# Patient Record
Sex: FEMALE | Race: WHITE
[De-identification: names, ages, dates, MRNs, and addresses within clinical notes are randomized per-mention and may not be internally consistent; named-entity substitution may affect disease eponyms.]

---

## 2020-01-03 NOTE — KCIC
Examination: KNEE BILAT 3V

 

History: Bilateral knee pain

 

Comparison/Correlation: None

 

Findings: Frontal and lateral views of the right knee and frontal and 

lateral views of the left knee were obtained.

 

Small right knee joint effusion is present. No acute fracture or bone 

destruction. Joint spaces are unremarkable.

 

Spurring about the left patella noted. Small left knee joint effusion is 

present. Severe left medial compartment degenerative narrowing is present.

Spurring about the medial and lateral compartments of the left knee noted.

No fracture or bone destruction.

 

 

Impression:

Severe left medial compartment  narrowing.

 

Bilateral knee joint effusions.

 

Electronically signed by: Jacobo Macias MD (1/3/2020 5:33 PM) [facility]

## 2020-01-03 NOTE — KCIC
Examination: ANKLE RIGHT 3V, FOOT RIGHT 3V

 

History: Ankle pain

 

Comparison/Correlation: None

 

Findings: 3 images right foot and 3 views of the right ankle were 

obtained.

 

Ankle joint mortise is unremarkable. Minimal spurring about the medial 

malleolus noted. No acute fracture or bony destruction. Small calcaneal 

spur identified.

 

 

Impression:

Mild spurring.

 

Electronically signed by: Jacobo Macias MD (1/3/2020 5:34 PM) [facility]

## 2020-01-03 NOTE — KCIC
Examination: ANKLE RIGHT 3V, FOOT RIGHT 3V

 

History: Ankle pain

 

Comparison/Correlation: None

 

Findings: 3 images right foot and 3 views of the right ankle were 

obtained.

 

Ankle joint mortise is unremarkable. Minimal spurring about the medial 

malleolus noted. No acute fracture or bony destruction. Small calcaneal 

spur identified.

 

 

Impression:

Mild spurring.

 

Electronically signed by: Jacobo Macias MD (1/3/2020 5:34 PM) [facility]

## 2020-01-29 VITALS
SYSTOLIC BLOOD PRESSURE: 158 MMHG | SYSTOLIC BLOOD PRESSURE: 158 MMHG | DIASTOLIC BLOOD PRESSURE: 67 MMHG | SYSTOLIC BLOOD PRESSURE: 158 MMHG | DIASTOLIC BLOOD PRESSURE: 67 MMHG | DIASTOLIC BLOOD PRESSURE: 67 MMHG

## 2020-01-29 NOTE — RAD
Examination: CT head and cervical spine without contrast

 

HISTORY: History of fall, pain

 

COMPARISON: None available

 

 

CT HEAD

 

 

Exposure: One or more of the following individualized dose reduction 

techniques were utilized for this examination:  1. Automated exposure 

control  2. Adjustment of the mA and/or kV according to patient size  3. 

Use of iterative reconstruction technique

 

TECHNIQUE: 5 mm contiguous axial images were obtained from the skull base 

to the vertex in both bone and soft tissue algorithm.  

 

FINDINGS: 

No abnormal attenuation within the brain parenchyma.  

 

No evidence of acute intracranial hemorrhage.   No extra-axial fluid 

collections.

 

No mass effect or midline shift. Ventricular size is appropriate.  Basal 

cisterns are patent.

 

No fractures identified.Gray-white differentiation is preserved.Globes and

orbits are within normal limits.   Paranasal sinuses and mastoid air cells

are clear.   

 

IMPRESSION:

 

No acute intracranial findings. 

 

 

CT CERVICAL SPINE

 

 

 

Technique: 2.5 mm contiguous axial images were obtained from the skull 

base through the cervicothoracic junction in both bone and soft tissue 

algorithm.  Additional sagittal and coronal reconstructions were also 

performed. 

 

FINDINGS: Vertebral body height and alignment are maintained.  Cervical 

lordosis is preserved.  The lateral masses of C1 are aligned upon C2.  

 

No fractures identified.  The bony canal is patent throughout.

 

Mild intervertebral disc height loss identified in the cervical spine 

likely degenerative changes.  

 

The paraspinous soft tissues are unremarkable.  Visualized intracranial 

contents are unremarkable.  Lung apices are clear. 

 

IMPRESSION:

 

No acute fracture of the cervical spine. Correlate clinically.   

 

Electronically signed by: Hamzah Carter MD (1/29/2020 5:26 PM) [facility]

## 2020-01-29 NOTE — RAD
Examination: CT lumbar spine without contrast

 

HISTORY: History of fall, back pain

 

COMPARISON: None available.

 

TECHNIQUE: Axial CT images of the lumbar spine were performed without 

contrast. Coronal and sagittal 

reformats are performed

 

Exposure: One or more of the following individualized dose reduction 

techniques were utilized for this examination:  1. Automated exposure 

control  2. Adjustment of the mA and/or kV according to patient size  3. 

Use of iterative reconstruction technique

 

 

 

FINDINGS:

 

The lumbar vertebral body heights are maintained. The bilateral facets are

well aligned. Moderate intervertebral disc height loss identified at L5-S1

vertebral level with diffuse disc bulge causing moderate to severe spinal 

canal stenosis. Moderate disc bulge identified at L3-L4, L4 L4 vertebral 

level causing moderate stenosis. Tiny punctate 1 mm intrarenal collecting 

system calculi identified in the bilateral kidneys. There is a 9 mm 

calculus identified in the proximal right ureter causing mild right-sided 

hydronephrosis.

 

 

 

IMPRESSION:

 

1. No acute osseous findings.

 

2. Multilevel degenerative changes as described above.

 

3. 9 mm calculus in the proximal right ureter causing mild right-sided 

hydronephrosis.

 

Electronically signed by: Hamzah Carter MD (1/29/2020 5:43 PM) [facility]

## 2020-01-29 NOTE — PHYS DOC
Past Medical History


Past Medical History:  Cancer (melonoma of skin), GERD, High Cholesterol, 

Migraines


 (HUGO FONSECA)


Past Surgical History:  No Surgical History


 (HUGO FONSECA)


Alcohol Use:  Occasionally


 (HUGO FONSECA)


Attending Signature


I have participated in the care of this patient and I have reviewed and agree 

with all pertinent clinical information above including history, exam, and 

recommendations.





 (JADON DIANE MD)





Adult General


Chief Complaint


Chief Complaint:  HEAD INJURY/TRAUMA





HPI


HPI





Patient is a 49  year old female with history of hyperlipidemia, acid reflex, 

insomnia, ADD, memory problems, migraine headaches, who presents to the ED today

from the doctor's office to be evaluated for concussion. PCP sent patient to get

a CAT scan of the head and cervical spine. Patient reports on Thursday last week

she slipped on ice and fell landing on her bottom. Denies hitting her head on 

the ground. Denies any loss of consciousness. Denies being on any blood 

thinners. She rates her headache and back pain is 10 out of 10. Denies pain 

radiating to bilateral lower extremities. Denies any loss of bowel bladder 

function. Patient is very shot during conversations.


 (HUGO FONSECA)





Review of Systems


Review of Systems





Constitutional: Denies fever or chills []


Eyes: Denies change in visual acuity, redness, or eye pain []


HENT: Denies nasal congestion or sore throat []


Respiratory: Denies cough or shortness of breath []


Cardiovascular: No additional information not addressed in HPI []


GI: Denies abdominal pain, nausea, vomiting, bloody stools or diarrhea []


: Denies dysuria or hematuria []


Musculoskeletal: Reports low back pain


Integument: Denies rash or skin lesions []


Neurologic: Reports posterior head pain, denies focal weakness or sensory 

changes []








All other systems were reviewed and found to be within normal limits, except as 

documented in this note.


 (HUGO FONSECA)





Current Medications


Current Medications





Current Medications








 Medications


  (Trade)  Dose


 Ordered  Sig/Selena  Start Time


 Stop Time Status Last Admin


Dose Admin


 


 Ketorolac


 Tromethamine


  (Toradol Im)  60 mg  1X  ONCE  1/29/20 19:00


 1/29/20 19:01 DC 1/29/20 19:05


60 MG


 


 Tamsulosin HCl


  (Flomax)  0.4 mg  1X  ONCE  1/29/20 19:00


 1/29/20 19:01 DC 1/29/20 19:04


0.4 MG





 (JADON DIANE MD)





Allergies


Allergies





Allergies








Coded Allergies Type Severity Reaction Last Updated Verified


 


  No Known Drug Allergies    1/29/20 No





 (JADON DIANE MD)





Physical Exam


Physical Exam





Constitutional: Well developed, well nourished, no acute distress, non-toxic 

appearance. []


HENT: Normocephalic, atraumatic, bilateral external ears normal, oropharynx 

moist, no oral exudates, nose normal. []


Eyes: PERRLA, EOMI, conjunctiva normal, no discharge. [] 


Neck: Normal range of motion, no tenderness, supple, no stridor. [] 


Cardiovascular:Heart rate regular rhythm, no murmur []


Lungs & Thorax:  Bilateral breath sounds clear to auscultation []


Abdomen: Bowel sounds normal, soft, no tenderness, no masses, no pulsatile 

masses. [] 


Skin: Warm, dry, no erythema, no rash. [] 


Back: Diffuse paraspinal muscle tenderness bilateral lumbar spine including 

slight midline lumbar spine tenderness, no CVA tenderness. [] 


Extremities: No tenderness, no cyanosis, no clubbing, ROM intact, no edema. [] 


Neurologic: Alert and oriented X 3, normal motor function, normal sensory 

function, no focal deficits noted. Cranial nerves II through XII intact.


Psychologic: Flat affect


 (MUTUNGA,HUGO APRN)





Current Patient Data


Vital Signs





                                   Vital Signs








  Date Time  Temp Pulse Resp B/P (MAP) Pulse Ox O2 Delivery O2 Flow Rate FiO2


 


1/29/20 19:43  91 17 158/67 (97) 98 Room Air  


 


1/29/20 16:06 98.4       





 98.4       





 (JADON DIANE MD)


Lab Values





                                Laboratory Tests








Test


 1/29/20


16:05


 


Urine Collection Type Unknown  


 


Urine Color Yellow  


 


Urine Clarity Clear  


 


Urine pH 6.0  


 


Urine Specific Gravity 1.020  


 


Urine Protein


 100 mg/dL


(NEG-TRACE)


 


Urine Glucose (UA)


 Negative mg/dL


(NEG)


 


Urine Ketones (Stick)


 Negative mg/dL


(NEG)


 


Urine Blood Small (NEG)  


 


Urine Nitrite


 Negative (NEG)





 


Urine Bilirubin Small (NEG)  


 


Urine Urobilinogen Dipstick


 0.2 mg/dL (0.2


mg/dL)


 


Urine Leukocyte Esterase Trace (NEG)  


 


Urine RBC


 Occ /HPF (0-2)





 


Urine WBC


 11-20 /HPF


(0-4)


 


Urine Squamous Epithelial


Cells Mod /LPF  





 


Urine Bacteria


 Few /HPF


(0-FEW)


 


Urine Hyaline Casts Moderate /HPF  


 


Urine Mucus Mod /LPF  


 


Urine Opiates Screen Neg (NEG)  


 


Urine Methadone Screen Neg (NEG)  


 


Urine Barbiturates Neg (NEG)  


 


Urine Phencyclidine Screen Neg (NEG)  


 


Urine


Amphetamine/Methamphetamine Pos (NEG)  





 


Urine Benzodiazepines Screen Neg (NEG)  


 


Urine Cocaine Screen Neg (NEG)  


 


Urine Cannabinoids Screen Neg (NEG)  


 


Urine Ethyl Alcohol Neg (NEG)  





 (JADON DIANE MD)


Lab Values





                                Laboratory Tests








Test


 1/29/20


16:05


 


Urine Collection Type Unknown  


 


Urine Color Yellow  


 


Urine Clarity Clear  


 


Urine pH 6.0  


 


Urine Specific Gravity 1.020  


 


Urine Protein


 100 mg/dL


(NEG-TRACE)


 


Urine Glucose (UA)


 Negative mg/dL


(NEG)


 


Urine Ketones (Stick)


 Negative mg/dL


(NEG)


 


Urine Blood Small (NEG)  


 


Urine Nitrite


 Negative (NEG)





 


Urine Bilirubin Small (NEG)  


 


Urine Urobilinogen Dipstick


 0.2 mg/dL (0.2


mg/dL)


 


Urine Leukocyte Esterase Trace (NEG)  


 


Urine RBC


 Occ /HPF (0-2)





 


Urine WBC


 11-20 /HPF


(0-4)


 


Urine Squamous Epithelial


Cells Mod /LPF  





 


Urine Bacteria


 Few /HPF


(0-FEW)


 


Urine Hyaline Casts Moderate /HPF  


 


Urine Mucus Mod /LPF  


 


Urine Opiates Screen Neg (NEG)  


 


Urine Methadone Screen Neg (NEG)  


 


Urine Barbiturates Neg (NEG)  


 


Urine Phencyclidine Screen Neg (NEG)  


 


Urine


Amphetamine/Methamphetamine Pos (NEG)  





 


Urine Benzodiazepines Screen Neg (NEG)  


 


Urine Cocaine Screen Neg (NEG)  


 


Urine Cannabinoids Screen Neg (NEG)  


 


Urine Ethyl Alcohol Neg (NEG)  








 (MUTUNGAHUGO APRN)





EKG


EKG


[]


 (VIKKIUNGAHUGO APRN)





Radiology/Procedures


Radiology/Procedures


[]PROCEDURE: CT HEAD AND CERVICAL SPINE WO





Examination: CT head and cervical spine without contrast


 


HISTORY: History of fall, pain


 


COMPARISON: None available


 


 


CT HEAD


 


 


Exposure: One or more of the following individualized dose reduction 


techniques were utilized for this examination:  1. Automated exposure 


control  2. Adjustment of the mA and/or kV according to patient size  3. 


Use of iterative reconstruction technique


 


TECHNIQUE: 5 mm contiguous axial images were obtained from the skull base 


to the vertex in both bone and soft tissue algorithm.  


 


FINDINGS: 


No abnormal attenuation within the brain parenchyma.  


 


No evidence of acute intracranial hemorrhage.   No extra-axial fluid 


collections.


 


No mass effect or midline shift. Ventricular size is appropriate.  Basal 


cisterns are patent.


 


No fractures identified.Gray-white differentiation is preserved.Globes and


orbits are within normal limits.   Paranasal sinuses and mastoid air cells


are clear.   


 


IMPRESSION:


 


No acute intracranial findings. 


 


 


CT CERVICAL SPINE


 


 


 


Technique: 2.5 mm contiguous axial images were obtained from the skull 


base through the cervicothoracic junction in both bone and soft tissue 


algorithm.  Additional sagittal and coronal reconstructions were also 


performed. 


 


FINDINGS: Vertebral body height and alignment are maintained.  Cervical 


lordosis is preserved.  The lateral masses of C1 are aligned upon C2.  


 


No fractures identified.  The bony canal is patent throughout.


 


Mild intervertebral disc height loss identified in the cervical spine 


likely degenerative changes.  


 


The paraspinous soft tissues are unremarkable.  Visualized intracranial 


contents are unremarkable.  Lung apices are clear. 


 


IMPRESSION:


 


No acute fracture of the cervical spine. Correlate clinically.   


 


Electronically signed by: Hamzah Carter MD (1/29/2020 5:26 PM) [facility]














DICTATED and SIGNED BY:     HAMZAH CARTER MD


DATE:     01/29/20 1726


PROCEDURE: CT LUMBAR SPINE WO CONTRAST





Examination: CT lumbar spine without contrast


 


HISTORY: History of fall, back pain


 


COMPARISON: None available.


 


TECHNIQUE: Axial CT images of the lumbar spine were performed without 


contrast. Coronal and sagittal 


reformats are performed


 


Exposure: One or more of the following individualized dose reduction 


techniques were utilized for this examination:  1. Automated exposure 


control  2. Adjustment of the mA and/or kV according to patient size  3. 


Use of iterative reconstruction technique


 


 


 


FINDINGS:


 


The lumbar vertebral body heights are maintained. The bilateral facets are


well aligned. Moderate intervertebral disc height loss identified at L5-S1


vertebral level with diffuse disc bulge causing moderate to severe spinal 


canal stenosis. Moderate disc bulge identified at L3-L4, L4 L4 vertebral 


level causing moderate stenosis. Tiny punctate 1 mm intrarenal collecting 


system calculi identified in the bilateral kidneys. There is a 9 mm 


calculus identified in the proximal right ureter causing mild right-sided 


hydronephrosis.


 


 


 


IMPRESSION:


 


1. No acute osseous findings.


 


2. Multilevel degenerative changes as described above.


 


3. 9 mm calculus in the proximal right ureter causing mild right-sided 


hydronephrosis.


 


Electronically signed by: Hamzah Carter MD (1/29/2020 5:43 PM) [facility]














DICTATED and SIGNED BY:     HAMZAH CARTER MD


DATE:     01/29/20 1743


 (HUGO FONSECA)





Course & Med Decision Making


Course & Med Decision Making


Pertinent Labs and Imaging studies reviewed. (See chart for details)





This is a 49-year-old female patient presenting to the ED today complaining of 

head pain, low back pain. Patient fell down on Thursday last week, was seen by 

the PCP today and was sent to the ED for CAT scan of the head, cervical spine. 

Patient denies any loss of consciousness when she fell, she never hit her head 

on the ground. She has a flat affect and barely giving us any information





CT of the head and cervical spine is negative for any acute findings.  Ct of the

 lumbar spine is negative for acute process notef for incidental findings of  9 

mm calculus in the proximal right ureter causing mild right-sided hydronephr

osis. urine with trace leukocytes. 





Spoke with Dr. Degroot at [facility], she requested 

patient be discharged to home and contact Dr. Gongora a urologist tomorrow 

morning and set up a follow-up appointment. Patient was given Toradol and Flomax

 in the ED. Discharged with flomax, and cipro. 














 (HUGO FONSECA)





Dragon Disclaimer


Dragon Disclaimer


This electronic medical record was generated, in whole or in part, using a voice

 recognition dictation system.


 (HUGO FONSECA)





Departure


Departure


Impression:  


   Primary Impression:  


   Fall from standing


   Additional Impressions:  


   Concussion


   Kidney stone


   Acute cervical sprain


   Hydronephrosis, right


Disposition:  01 HOME, SELF-CARE


Condition:  STABLE


Referrals:  


GALINDO LEON MD (PCP)


Patient Instructions:  Concussion-SportsMed, Fall Prevention and Home Safety, 

Kidney Stones, Easy-to-Read





Additional Instructions:  


You were evaluated in the emergency room after falling, your CAT scan of the 

head, cervical spine and lumbar spine were negative for any acute findings, You 

were  noted to have an incidental finding  9 mm calculus in the proximal right 

ureter causing mild right-sided 


hydronephrosis. 





Please contact Dr. Bhaskar Gongora at [facility] tomorrow 

morning and set up a follow-up appointment. His phone number is [phone]


Scripts


Ciprofloxacin (CIPRO) 500 Mg/5 Ml [email or web address]


1 ML PO BID for 10 Days, #100 ML 0 Refills


   Prov: HUGO FONSECA APRN         1/29/20 


Tamsulosin Hcl (FLOMAX) 0.4 Mg Cap.er.24h


1 CAP PO DAILY, #6 CAP


   Prov: HUGO FONSECA APRN         1/29/20





Problem Qualifiers








   Primary Impression:  


   Fall from standing


   Encounter type:  initial encounter  Qualified Codes:  W19.XXXA - Unspecified 

   fall, initial encounter


   Additional Impressions:  


   Concussion


   Encounter type:  initial encounter  Loss of consciousness presence/duration: 

    with LOC of unspecified duration  Qualified Codes:  S06.0X9A - Concussion 

   with loss of consciousness of unspecified duration, initial encounter


   Acute cervical sprain


   Encounter type:  initial encounter  Qualified Codes:  S13.9XXA - Sprain of 

   joints and ligaments of unspecified parts of neck, initial encounter








HUGO FONSECA KAROLINA              Jan 29, 2020 16:46


JADON DIANE MD              Jan 30, 2020 04:10

## 2020-06-04 ENCOUNTER — HOSPITAL ENCOUNTER (OUTPATIENT)
Dept: HOSPITAL 61 - MAMMO | Age: 50
Discharge: HOME | End: 2020-06-04
Attending: FAMILY MEDICINE
Payer: SELF-PAY

## 2020-06-04 DIAGNOSIS — N64.89: ICD-10-CM

## 2020-06-04 DIAGNOSIS — Z12.31: Primary | ICD-10-CM

## 2020-06-04 PROCEDURE — 77067 SCR MAMMO BI INCL CAD: CPT

## 2020-06-08 NOTE — RAD
DATE: 6/4/2020 12:23 PM



EXAM: DIGITAL SCREEN BILAT W/CAD



HISTORY:  Screening



COMPARISON: 5/31/2019



Bilateral full field craniocaudal and mediolateral oblique images were

obtained using digital technique.



This study was interpreted with the benefit of Computerized Aided Detection

(CAD).





FINDINGS:



Breast Density: SCATTERED  The breast parenchyma shows scattered

fibroglandular densities. Breast parenchyma level B





No suspicious masses, microcalcifications or architectural distortion is

present to suggest malignancy in either breast.





The visualized axillae are unremarkable. 



IMPRESSION: No mammographic evidence of malignancy. 



BI-RADS CATEGORY: 1 NEGATIVE



RECOMMENDED FOLLOW-UP: 12M 12 MONTH FOLLOW-UP Annual screening mammography is

recommended, unless clinically indicated sooner based on symptoms or change in

physical exam.



PQRS compliance statement: Patient information was entered into a reminder

system with a target due date 6/5/2021 for the next mammogram.



Mammography is a sensitive method for finding small breast cancers, but it

does not detect them all and is not a substitute for careful clinical

examination.  A negative mammogram does not negate a clinically suspicious

finding and should not result in delay in biopsying a clinically suspicious

abnormality.



"Our facility is accredited by the American College of [employer] Mammography

Program."

## 2021-02-03 ENCOUNTER — HOSPITAL ENCOUNTER (OUTPATIENT)
Dept: HOSPITAL 61 - KCIC | Age: 51
End: 2021-02-03
Attending: FAMILY MEDICINE
Payer: COMMERCIAL

## 2021-02-03 DIAGNOSIS — M79.671: ICD-10-CM

## 2021-02-03 DIAGNOSIS — M17.0: Primary | ICD-10-CM

## 2021-02-03 PROCEDURE — 73630 X-RAY EXAM OF FOOT: CPT

## 2021-02-03 NOTE — KCIC
XR FOOT_RIGHT 3 VIEWS 2/3/2021 3:01 PM



INDICATION: Right anterior foot pain



COMPARISON: None available.



TECHNIQUE:  2 views the right foot are provided.



FINDINGS/

IMPRESSION:

There is no acute fracture or dislocation. Joint spaces are maintained. Bone mineralization is within
 normal limits. Regional soft tissues are within normal limits. There is no soft tissue gas or osseou
s erosion. No radiopaque foreign body.



Electronically signed by: Janine Cooper MD (2/3/2021 8:47 PM) [facility]

## 2021-02-03 NOTE — KCIC
XR KNEE 3 VIEWS 



DATE:  2/3/2021 3:01 PM



INDICATION:  Chronic bilateral knee pain, Lt>Rt, previous surgery Lt knee. General knee pain   



COMPARISON:  None.



FINDINGS:



Right: There is no evidence of acute fracture or dislocation. Mild degenerative changes of the medial
 compartment. There is no joint effusion. 



Left: There is no evidence of acute fracture or dislocation. Severe degenerative changes of the media
l compartment, mild lateral and patellofemoral compartment degenerative changes. There is no joint ef
fusion. 



IMPRESSION:  



Bilateral degenerative changes, worst on the left with severe medial compartment joint space narrowin
g.



Electronically signed by: Marcos Lane MD (2/3/2021 6:30 PM) [facility]

## 2021-06-25 ENCOUNTER — HOSPITAL ENCOUNTER (OUTPATIENT)
Dept: HOSPITAL 61 - KCIC DEXA | Age: 51
End: 2021-06-25
Attending: FAMILY MEDICINE
Payer: COMMERCIAL

## 2021-06-25 DIAGNOSIS — E55.9: Primary | ICD-10-CM

## 2021-06-25 PROCEDURE — 77080 DXA BONE DENSITY AXIAL: CPT

## 2021-06-25 NOTE — KCIC
Bone Densitometry 



History: Reason: VITAMIN D DEFICIENCY / Spl. Instructions:  / History: 



Findings:



Bone Densitometry was performed with dual photon absorption of the lumbar spine and proximal femurs. 




Lumbar Spine L1-L4:



Bone density is 1.071 g/cm2 for L1-L4. T-score is 0.2. Z-score is 1.1. 



Total left proximal femur: 



Bone density is 0.910 g/cm2.  T-score is -0.3. Z-score is 0.3. 



IMPRESSION:

Normal bone mineral density..  



World Health Organization definition of osteoporosis and osteopenia for  women: normal equal
s T score at or above -1.0 standard deviations; osteopenia equals T score between -1.0 and -2.5 stand
nichole deviations; osteoporosis equals T score at or below -2.5 standard deviations.  



Electronically signed by: Mar Toure MD (6/25/2021 2:19 PM) [id number]

## 2021-08-09 ENCOUNTER — HOSPITAL ENCOUNTER (OUTPATIENT)
Dept: HOSPITAL 35 - PAC | Age: 51
End: 2021-08-09
Attending: ORTHOPAEDIC SURGERY
Payer: COMMERCIAL

## 2021-08-09 DIAGNOSIS — Z01.810: ICD-10-CM

## 2021-08-09 DIAGNOSIS — R00.1: ICD-10-CM

## 2021-08-09 DIAGNOSIS — M17.12: ICD-10-CM

## 2021-08-09 DIAGNOSIS — Z01.812: Primary | ICD-10-CM

## 2021-08-09 LAB
ALBUMIN SERPL-MCNC: 4 G/DL (ref 3.4–5)
ANION GAP SERPL CALC-SCNC: 10 MMOL/L (ref 7–16)
BILIRUB UR-MCNC: NEGATIVE MG/DL
BUN SERPL-MCNC: 18 MG/DL (ref 7–18)
CALCIUM SERPL-MCNC: 8.9 MG/DL (ref 8.5–10.1)
CHLORIDE SERPL-SCNC: 109 MMOL/L (ref 98–107)
CO2 SERPL-SCNC: 26 MMOL/L (ref 21–32)
COLOR UR: YELLOW
CREAT SERPL-MCNC: 1.1 MG/DL (ref 0.6–1)
ERYTHROCYTE [DISTWIDTH] IN BLOOD BY AUTOMATED COUNT: 13.1 % (ref 10.5–14.5)
GLUCOSE SERPL-MCNC: 82 MG/DL (ref 74–106)
HCT VFR BLD CALC: 42 % (ref 37–47)
HGB BLD-MCNC: 14 GM/DL (ref 12–15)
INR PPP: 1.05
KETONES UR STRIP-MCNC: (no result) MG/DL
MCH RBC QN AUTO: 30 PG (ref 26–34)
MCHC RBC AUTO-ENTMCNC: 33.4 G/DL (ref 28–37)
MCV RBC: 89.7 FL (ref 80–100)
PLATELET # BLD: 202 THOU/UL (ref 150–400)
POTASSIUM SERPL-SCNC: 4.1 MMOL/L (ref 3.5–5.1)
PROTHROMBIN TIME: 11.4 SECONDS (ref 10.5–12.1)
RBC # BLD AUTO: 4.68 MIL/UL (ref 4.2–5)
RBC # UR STRIP: NEGATIVE /UL
SODIUM SERPL-SCNC: 145 MMOL/L (ref 136–145)
SP GR UR STRIP: 1.02 (ref 1–1.03)
URINE CLARITY: CLEAR
URINE GLUCOSE-RANDOM*: NEGATIVE
URINE LEUKOCYTES-REFLEX: (no result)
URINE NITRITE-REFLEX: NEGATIVE
URINE PROTEIN (DIPSTICK): (no result)
UROBILINOGEN UR STRIP-ACNC: 0.2 E.U./DL (ref 0.2–1)
WBC # BLD AUTO: 6.6 THOU/UL (ref 4–11)

## 2021-08-09 NOTE — EKG
[address] [place]  [address]
Phone:  [phone]                    ELECTROCARDIOGRAM REPORT      
_______________________________________________________________________________
 
Name:       JOSE ALEJANDRO PAGAN              Room #:                     [facility]#:      [id number]     Account #:      [id number]  
Admission:  21    Attend Phys:    Deandre Bridges MD  
Discharge:              Date of Birth:  [birth year]  
                                                          Report #: [id number]
   [id number]
_______________________________________________________________________________
                          [facility]
                                       
Test Date:    2021               Test Time:    13:20:15
Pat Name:     JOSE ALEJANDRO PAGAN           Department:   
Patient ID:   [id number]            Room:          
Gender:       F                        Technician:   CIPRIANO
:          [id number]               Requested By: Deandre Bridges
Order Number: [id number] MD:   Gustabo Bland
                                 Measurements
Intervals                              Axis          
Rate:         57                       P:            27
OH:           147                      QRS:          19
QRSD:         96                       T:            24
QT:           417                                    
QTc:          406                                    
                           Interpretive Statements
Sinus bradycardia
Otherwise normal tracing
No previous ECG available for comparison
Electronically Signed On 2021 15:54:31 CDT by Gustabo Bland
[email or web address]
 
 
 
 
 
 
 
 
 
 
 
 
 
 
 
 
 
 
 
 
 
 
  <ELECTRONICALLY SIGNED>
   By: uGstabo Bland MD, [facility]   
  21     1554
D: 21 1320                           _____________________________________
T: 21 1320                           Gustabo Bland MD, FACC     /EPI

## 2021-08-16 ENCOUNTER — HOSPITAL ENCOUNTER (OUTPATIENT)
Dept: HOSPITAL 35 - OR | Age: 51
Setting detail: OBSERVATION
LOS: 1 days | Discharge: HOME | End: 2021-08-17
Attending: ORTHOPAEDIC SURGERY | Admitting: ORTHOPAEDIC SURGERY
Payer: COMMERCIAL

## 2021-08-16 VITALS — DIASTOLIC BLOOD PRESSURE: 74 MMHG | SYSTOLIC BLOOD PRESSURE: 144 MMHG

## 2021-08-16 VITALS — DIASTOLIC BLOOD PRESSURE: 69 MMHG | SYSTOLIC BLOOD PRESSURE: 127 MMHG

## 2021-08-16 VITALS — WEIGHT: 221.9 LBS | BODY MASS INDEX: 43.56 KG/M2 | HEIGHT: 60 IN

## 2021-08-16 DIAGNOSIS — Z79.82: ICD-10-CM

## 2021-08-16 DIAGNOSIS — Z79.899: ICD-10-CM

## 2021-08-16 DIAGNOSIS — Z20.822: ICD-10-CM

## 2021-08-16 DIAGNOSIS — M17.12: Primary | ICD-10-CM

## 2021-08-16 LAB
BILIRUB UR-MCNC: NEGATIVE MG/DL
COLOR UR: YELLOW
KETONES UR STRIP-MCNC: NEGATIVE MG/DL
NITRITE UR QL STRIP: NEGATIVE
RBC # UR STRIP: (no result) /UL
SP GR UR STRIP: 1.02 (ref 1–1.03)
URINE CLARITY: CLEAR
URINE GLUCOSE-RANDOM*: NEGATIVE
URINE LEUKOCYTES: NEGATIVE
URINE PROTEIN (DIPSTICK): NEGATIVE
UROBILINOGEN UR STRIP-ACNC: 0.2 E.U./DL (ref 0.2–1)

## 2021-08-16 PROCEDURE — 62900: CPT

## 2021-08-16 PROCEDURE — 53000 INCISION OF URETHRA: CPT

## 2021-08-16 PROCEDURE — 51130: CPT

## 2021-08-16 PROCEDURE — 56527: CPT

## 2021-08-16 PROCEDURE — 57095: CPT

## 2021-08-16 PROCEDURE — 54118: CPT

## 2021-08-16 PROCEDURE — 50415: CPT

## 2021-08-16 PROCEDURE — 57103: CPT

## 2021-08-16 PROCEDURE — 57110 VAGNC COMPL RMVL VAG WALL: CPT

## 2021-08-16 PROCEDURE — 52001 CYSTO W/IRRG&EVAC MLT CLOTS: CPT

## 2021-08-16 PROCEDURE — 50954: CPT

## 2021-08-16 PROCEDURE — 56528: CPT

## 2021-08-16 PROCEDURE — 64043: CPT

## 2021-08-16 PROCEDURE — 50010 RENAL EXPLORATION: CPT

## 2021-08-16 PROCEDURE — 65060: CPT

## 2021-08-16 PROCEDURE — 62110: CPT

## 2021-08-16 PROCEDURE — 52282 CYSTOSCOPY IMPLANT STENT: CPT

## 2021-08-16 PROCEDURE — 57127: CPT

## 2021-08-16 PROCEDURE — 51320: CPT

## 2021-08-16 PROCEDURE — 51225: CPT

## 2021-08-16 PROCEDURE — 53078: CPT

## 2021-08-16 PROCEDURE — 70005: CPT

## 2021-08-16 PROCEDURE — 53365: CPT

## 2021-08-16 PROCEDURE — 50101: CPT

## 2021-08-16 NOTE — O
[facility]
[place]   [address]                     OPERATIVE REPORT              
_______________________________________________________________________________
 
Name:       JOSE ALEJANDRO PAGAN              Room #:         443-P       [facility] Soraida DIALLO#:      [id number]                       Account #:      [id number]  
Admission:  08/16/21    Attend Phys:    Deandre Bridges MD  
Discharge:              Date of Birth:  [birth year]  
                                                          Report #: [id number]
                                                                    [id number] 
_______________________________________________________________________________
THIS REPORT FOR:  
 
cc:  Physician not on staff        
     Physician not on staff                                               
     Deandre Bridges MD                                          ~
 
DATE OF SERVICE: 08/16/2021
 
PREOPERATIVE DIAGNOSIS:  Left knee osteoarthritis.
 
POSTOPERATIVE DIAGNOSIS:  Left knee osteoarthritis.
 
PROCEDURE:  Left total knee arthroplasty using Navio robotic assistance.
 
SURGEON:  Deandre Bridges MD
 
ASSISTANT:  Marie Garg PA-C.
 
INDICATION FOR ASSISTANT:  Throughout the case, extensive retraction, 
manipulation of the knee was required.  This was afforded to me by my assistant.
 
ANESTHESIA:  LMA with adductor canal block.
 
IMPLANTS:  A [employer] size 3 Journey II BCS Oxinium femur, size 2 tibia,
size 12 constrained polyethylene and size 29 patella.
 
TOURNIQUET TIME:  51 minutes.
 
ESTIMATED BLOOD LOSS:  25 mL.
 
COMPLICATIONS:  None.
 
SPECIMENS:  None.
 
CONDITION UPON LEAVING THE OR:  Stable.
 
INDICATIONS FOR PROCEDURE:  The patient is a 51-year-old female with severe left
knee osteoarthritis.  She had failed conservative measures for this and after 
discussion with her, she elected for left total knee arthroplasty.
 
DESCRIPTION OF PROCEDURE:  Risks, benefits, alternatives, complications were 
discussed in detail with the patient including but not limited to risk of 
anesthesia, risk of damage to nerves, arteries, blood vessels, risk for 
infection, bleeding, risk for continued knee pain, need for reoperation.  
Informed consent was obtained from the patient's left knee was appropriately 
marked in the preoperative holding area.  IV Ancef was given for preoperative 
 
 
 
[address]   [address]                     OPERATIVE REPORT              
_______________________________________________________________________________
 
Name:       JOSE ALEJANDRO PAGAN CYDNEY              Room #:         443-P       [facility] Soraida DIALLO#:      [id number]                       Account #:      [id number]  
Admission:  08/16/21    Attend Phys:    Deandre Bridges MD  
Discharge:              Date of Birth:  [birth year]  
                                                          Report #: [id number]
                                                                    [id number] 
_______________________________________________________________________________
 
antibiotics.  Adductor canal block was placed by anesthesia.  She was brought to
the operating room and placed in supine position on the operating table.  LMA 
anesthesia was induced without complication.  Tourniquet was placed on the left 
side.  Left lower extremity was prepped and draped in normal sterile fashion.  
Timeout was performed properly identifying the patient and procedure as well as 
instrumentation and implants.  All in the operating room in agreement.  Left 
lower extremity was exsanguinated, tourniquet was inflated.  Tourniquet time was
51 minutes.  Standard midline approach to the knee was made with 10 blade 
through the skin.  Dissection was taken down sharply to the fascia and deep 
flaps were developed medially and laterally.  Fresh 10 blade was used to make a 
medial parapatellar arthrotomy and the knee was inspected.  There was severe 
medial compartment osteoarthritis with moderate lateral and patellofemoral 
compartment osteoarthritis.  ACL and PCL were removed sharply.  Reference pins 
were placed in the femur and the tibia.  The knee was then digitally mapped 
using the [employer] robotic system.  Intraoperative plan was made.  We sized the 
size 3 femur, the size 2 tibia with a 10 spacer.  After acceptance of the 
intraoperative plan, the distal femoral cut was made with Navio bur.  Distal 
femoral cutting block was pinned in place and chamfer cuts were made.  Attention
was turned to the tibia.  Remainder of the menisci removed with Bovie cautery.  
Tibial resection guide was pinned in place and tibial resection was made.  
Flexion and extension gaps were then checked and found to have good balance 
medially, laterally in flexion and extension.  Tibia sized, found to be a size 
2.  Size 2 tibial trial was placed, pinned and punched.  Size 3 femoral trial 
was placed, box cut was made.  This was then trialed with a size 10 up to a size
12 polyethylene, size 12 polyethylene demonstrated 1-2 mm of laxity laterally 
throughout range of motion of the knee.  There were up to 3 mm medially.  It was
felt we can make up for this with a constrained implant, 9 mm of bone was 
resected from the posterior surface of the patella and a size 29 patellar trial 
button was placed, knee was taken through range of motion, found to be stable, 
found to have good patellar tracking.  Trial components were removed.  Bone ends
were thoroughly irrigated with normal saline.  Final size 2 tibia, size 3 
Journey II BCS Oxinium femur and a size 29 patella were cemented in place using 
standard cementation techniques.  While the cement cured, a periarticular 
injection consisting of morphine, ropivacaine, epinephrine, Toradol was placed 
around the knee joint capsule.  After the cement cured, tourniquet was deflated.
 Hemostasis was obtained with Bovie cautery.  A final size 12 constrained 
polyethylene was placed.  A gram of vancomycin was placed deep in the joint.  
Fascia was closed with 0 Vicryl.  Skin was closed with 2-0 Vicryl, 3-0 Monocryl.
 Dermabond and a BRIAN dressing was applied.  The patient tolerated this 
procedure well and went to recovery room under care of anesthesia 
postoperatively.
 
 
                         
   By:                               
                   
D: 08/16/21 1549                           _____________________________________
T: 08/16/21 1905                           Deandre Bridges MD            /nt

## 2021-08-17 VITALS — DIASTOLIC BLOOD PRESSURE: 60 MMHG | SYSTOLIC BLOOD PRESSURE: 102 MMHG

## 2021-08-17 VITALS — SYSTOLIC BLOOD PRESSURE: 113 MMHG | DIASTOLIC BLOOD PRESSURE: 59 MMHG

## 2021-08-17 VITALS — DIASTOLIC BLOOD PRESSURE: 59 MMHG | SYSTOLIC BLOOD PRESSURE: 113 MMHG

## 2021-08-17 NOTE — NUR
Assumed care of pt at 0700. Pt a&ox4. Pain controlled with prn pain medicine.
Dressing c/d/i. DARSHAN hose and SCDs in place. Pt worked with physical therapy
this am. Likely going home this afternoon. Family at bedside. Call light
within reach. Will continue to monitor.

## 2021-08-17 NOTE — NUR
ADMISSION HX,EDUCATION AND ASSESSMENT COMPLETED.PT'S VSS.PT UP WITH ASSIST/GB
AND WALKER TO THE B[facility],GOOD ENDURANCE NOTED.PAIN MANAGED WITH MED.PER REPORT,PT
HAD APNEA EPISODE IN PACU,PT ON 4L/NC.PT ALERT,IRRITABLE AT START OF SHIFT.PT
BRENDA CLEAR LIQUID DIET WELL.PT WAS A LITTLE NAUSEOUS WHEN SHE GOT UP TO THE
BSC,REF MED.PT ON CONT PULSE OX,SAT IN UPPER 90'S.SCD/POLAR PACK AND DARSHAN IN
PLACE.PT ABLE TO MAKE HER NEEDS KNOWN.CALL LIGHT WITHIN REACH.

## 2021-08-17 NOTE — NUR
ASSESSMENT: CM REVIEWED CHART AND SPOKE WITH PATIENT AT THE BEDSIDE. PT IS
ALERT AND ORIENTED X4. PT IS S/P L TKA. PT REPORTS THAT SHE LIVES IN AN APT
BUT WILL BE STAYING IN A
HOUSE
WITH HER MOTHER AT DISCHARGE.  PT REPORTS HAVING ABOUT 5 STEPS WITH A HANDRAIL
TO ENTER AND
REPORTS ALL HER NEEDS ARE ON THE MAIN LEVEL. PT REPORTS BEING FULLY
INDEPENDENT WITH ADLS AND AMBULATION AND HAS NO DME. CM NOTIFIED PT SHE WILL
LIKELY NEED A WALKER AT THE TIME OF DISCHARGE AND PT REPORTS SHE HAS NO
PRFERENCE OF AGENCY BUT DOES NOT FEEL HER INSURANCE COVERES IT. CM NOTIFIED
LIASON AT [facility] TO PLEASE VERIFY. PT REPORTS THAT SHE HAS OUTPATIENT
THERAPY ARRANGED AT [facility]. CM DISCUSSED ROLE. PT IS TO CONTINUE
THERAPY AND WORK ON STAIRS THIS PM AND POSSIBLE DISCHARGE AFTER. AWAITING
FURTHER INPUT FROM [facility] REGARDING WALKER. CM WILL CONTINUE TO FOLLOW.

## 2021-08-20 ENCOUNTER — HOSPITAL ENCOUNTER (INPATIENT)
Dept: HOSPITAL 35 - ER | Age: 51
LOS: 6 days | Discharge: HOME | DRG: 552 | End: 2021-08-26
Attending: HOSPITALIST | Admitting: HOSPITALIST
Payer: COMMERCIAL

## 2021-08-20 VITALS — DIASTOLIC BLOOD PRESSURE: 85 MMHG | SYSTOLIC BLOOD PRESSURE: 162 MMHG

## 2021-08-20 VITALS — SYSTOLIC BLOOD PRESSURE: 146 MMHG | DIASTOLIC BLOOD PRESSURE: 51 MMHG

## 2021-08-20 VITALS — BODY MASS INDEX: 35.08 KG/M2 | HEIGHT: 62.99 IN | WEIGHT: 198 LBS

## 2021-08-20 VITALS — DIASTOLIC BLOOD PRESSURE: 38 MMHG | SYSTOLIC BLOOD PRESSURE: 106 MMHG

## 2021-08-20 DIAGNOSIS — F41.9: ICD-10-CM

## 2021-08-20 DIAGNOSIS — Z60.2: ICD-10-CM

## 2021-08-20 DIAGNOSIS — E66.01: ICD-10-CM

## 2021-08-20 DIAGNOSIS — K21.9: ICD-10-CM

## 2021-08-20 DIAGNOSIS — Z90.49: ICD-10-CM

## 2021-08-20 DIAGNOSIS — Z79.899: ICD-10-CM

## 2021-08-20 DIAGNOSIS — G47.33: ICD-10-CM

## 2021-08-20 DIAGNOSIS — D64.9: ICD-10-CM

## 2021-08-20 DIAGNOSIS — M54.5: ICD-10-CM

## 2021-08-20 DIAGNOSIS — M17.12: ICD-10-CM

## 2021-08-20 DIAGNOSIS — Z96.652: ICD-10-CM

## 2021-08-20 DIAGNOSIS — M79.652: ICD-10-CM

## 2021-08-20 DIAGNOSIS — Z79.82: ICD-10-CM

## 2021-08-20 DIAGNOSIS — G89.29: ICD-10-CM

## 2021-08-20 DIAGNOSIS — M51.16: Primary | ICD-10-CM

## 2021-08-20 DIAGNOSIS — G43.909: ICD-10-CM

## 2021-08-20 DIAGNOSIS — N39.0: ICD-10-CM

## 2021-08-20 DIAGNOSIS — M25.559: ICD-10-CM

## 2021-08-20 PROCEDURE — 10195: CPT

## 2021-08-20 PROCEDURE — 3E0R33Z INTRODUCTION OF ANTI-INFLAMMATORY INTO SPINAL CANAL, PERCUTANEOUS APPROACH: ICD-10-PCS | Performed by: HOSPITALIST

## 2021-08-20 PROCEDURE — 00HU33Z INSERTION OF INFUSION DEVICE INTO SPINAL CANAL, PERCUTANEOUS APPROACH: ICD-10-PCS | Performed by: HOSPITALIST

## 2021-08-20 PROCEDURE — 10040 EXTRACTION: CPT

## 2021-08-20 SDOH — SOCIAL STABILITY - SOCIAL INSECURITY: PROBLEMS RELATED TO LIVING ALONE: Z60.2

## 2021-08-20 NOTE — EMS
[address]   [address]                     EMS Patient Care Report       
_______________________________________________________________________________
 
Name:       JOSE ALEJANDRO PAGAN              Room #:         463-P       ADM IN  
M.R.#:      [id number]                       Account #:      [id number]  
Admission:  21    Attend Phys:    Flynn Richards MD 
Discharge:              Date of Birth:  [birth year]  
                                                          Report #: [id number]
                                                                    [id number]
_______________________________________________________________________________
THIS REPORT FOR:   //name//                          
 
Report Transmitted: 2021 08:17
EMS Care Summary
[facility]
Incident [id number] @ 2021 17:51
 
Incident Location
[address] [place] [address]
 
Patient
JOSE ALEJANDRO PAGAN
Female, 51 Years
 1970
 
Patient Address
[address] [place] [address]
 
Patient History
Knee Replacement,
 
Patient Allergies
No known allergies,
 
Patient Medications
Hydrocodone, Morphine,
 
Chief Complaint
"My hip hurts"
 
Disposition
Transported No Lights/[place]
 
Dispatch Reason
Back Pain (Non-Traumatic)
 
Transported To
[facility]
 
Narrative
 responds to report of possible back pain at local residence. After 
unremarkable response phase, 1144 enters scene to find one pt supine in her bed 
 
 
 
[address]   [address]                     EMS Patient Care Report       
_______________________________________________________________________________
 
Name:       JOSE ALEJANDRO PAGAN              Room #:         463-P       ADM IN  
M.R.#:      [id number]                       Account #:      [id number]  
Admission:  21    Attend Phys:    Flynn Richards MD 
Discharge:              Date of Birth:  [birth year]  
                                                          Report #: [id number]
                                                                    [id number]
_______________________________________________________________________________
with her mother at her side. Pt states that she activated EMS this afternoon 
because her sciatica pain in her hip has increased and she would like to be 
transported to the ED for evaluation. Pt states that she underwent a knee 
replacement surgery five days ago which was reportedly successful, she states 
that while she has been rehabilitating, her sciatica pain has become very 
severe. Pt states that she would like to wait until she arrives at the ED to 
receive treatment for her pain because she has taken morphine and hydrocodone 
today. Pt is assisted to stair chair and removed from residence. Pt is then 
secured to cot in POC. Pt remains A&Ox4 through duration of care and converses 
casually with EMS during transport. Care transferred to [facility] staff without 
incident.  clears and returns to service. 
 
Initial Vitals
@18:17P: 88,
@18:21P: 81,BP: 115/76,SpO2: 98,
@18:20P: 78,BP: 125/77,SpO2: 95,
@18:06P: 87,R: 16,BP: 121/73,Pain: 0/10,GCS: 15,Temp: 98.4F,SpO2: 95,Revised 
Trauma: 12, 
 
 
Impression
Pain (Non-Traumatic)
 
Procedures
@PTASurgical Mask on PatientResponse: Unchanged
 
Timeline
PTA,Surgical Mask on Patient,Response: Unchanged
17:48,Call Received
17:48,Psap Call
17:51,Dispatched
17:52,En Route
17:58,On Scene
18:00,At Patient
18:06,BP: 121/73 M,PULSE: 87,RR: 16 R,SPO2: 95 Ox,ETCO2:  ,BG: ,PAIN: 0,GCS: 15,
18:17,BP: / M,PULSE: 88,RR:  R,SPO2:  Ox,ETCO2:  ,BG: ,PAIN: ,GCS: ,
18:20,BP: 125/77 M,PULSE: 78,RR:  R,SPO2: 95 Ox,ETCO2:  ,BG: ,PAIN: ,GCS: ,
18:21,BP: 115/76 M,PULSE: 81,RR:  R,SPO2: 98 Ox,ETCO2:  ,BG: ,PAIN: ,GCS: ,
18:42,Depart Scene
18:56,At Destination
19:03,Call Closed
 
Disclaimer
v1.1     Copyright  [employer]
This EMS Care Summary contains data elements from the applicable legal record 
(which may be displayed differently). It is designed to provide pertinent 
 
 
 
[address]   [address]                     EMS Patient Care Report       
_______________________________________________________________________________
 
Name:       JOSE ALEJANDRO PAGAN              Room #:         463-P       ADM IN  
.R.#:      [id number]                       Account #:      [id number]  
Admission:  21    Attend Phys:    Flynn Richards MD 
Discharge:              Date of Birth:  [birth year]  
                                                          Report #: [id number]
                                                                    [id number]
_______________________________________________________________________________
information for the following purposes: continuity of care, clinical quality, 
and state data reporting. The complete legal record is available to ED staff 
and administrators of the receiving hospital in [facility]'s Patient Tracker. All data 
is provided "as is."

## 2021-08-20 NOTE — HPC
[address]   [address]                     PAIN MANAGEMENT CONSULTATION  
_______________________________________________________________________________
 
Name:       JOSE ALEJANDRO PAGAN              Room #:         439-P       ADM [place]  
M.R.#:      [id number]                       Account #:      [id number]  
Admission:  08/20/21    Attend Phys:    Carrington Banks MD    
Discharge:              Date of Birth:  [birth year]  
                                                          Report #: [id number]
                                                                    [id number] 
_______________________________________________________________________________
THIS REPORT FOR:  
 
cc:  FAM - Family physician unknown
     FAM - Family physician unknown                                       
     Edin Doe DO                                          ~
DATE OF SERVICE: 08/25/2021
 
DATE OF SERVICE:  08/25/2021.
 
CHIEF COMPLAINT:  Low back pain, left lower extremity pain with paresthesias.
 
HISTORY OF PRESENT ILLNESS:  As you know, the patient is a 51-year-old morbidly 
obese female admitted to the hospital approximately 4 days after undergoing a 
left knee surgery with increasing back pain, left lower extremity pain with 
paresthesias.  The patient had been noted to have changes at the L5-S1 level, 
which prompted a referral to our clinic to undergo lumbar epidural injection 
under fluoroscopic guidance to address her symptoms.  The patient has been in 
the hospital since her admission date of 08/24/2021, being treated for her 
ongoing pain issues.  She was seen by Neurosurgery and advised to try 
conservative treatment before looking toward surgical options.  She was 
subsequently referred on to our clinic to discuss a lumbar epidural injection.  
The patient reports no injury or trauma that may have led to symptom 
development.  She does follow with the [facility] for her pain management 
needs.  She is also following with the neurosurgeon at [facility].  Due to her
ongoing pain and recent surgery performed at our hospital, she was brought to 
our facility for evaluation.  She was established a consultation with us to 
discuss the possibility of undergoing a lumbar epidural injection under 
fluoroscopic guidance to address lumbar radicular pain.
 
PAST MEDICAL HISTORY:
1.  Chronic low back pain.
2.  Osteoarthritis, left knee requiring total knee arthroplasty.
3.  Chronic hip pain.
4.  Gastroesophageal reflux disease.
5.  Attention deficit disorder.
6.  Chronic back pain.
 
PAST SURGICAL HISTORY:
1.  Left total knee arthroplasty.
2.  Lumbar surgery.
3.  Right foot surgery.
 
SOCIAL HISTORY:  The patient is a nonsmoker.  Denies IV or illicit drug use.  
Denies any chronic alcohol use.  She is unaccompanied at today's visit.
 
REVIEW OF SYSTEMS:  Positive for low back pain, lower extremity pain with 
 
 
 
[address]   [address]                     PAIN MANAGEMENT CONSULTATION  
_______________________________________________________________________________
 
Name:       JOSE ALEJANDRO PAGAN CYDNEY              Room #:         439-P       [facility] [place]  
..#:      [id number]                       Account #:      [id number]  
Admission:  08/20/21    Attend Phys:    Carrington Banks MD    
Discharge:              Date of Birth:  [birth year]  
                                                          Report #: [id number]
                                                                    [id number] 
_______________________________________________________________________________
paresthesias on the left.  Left knee pain, status post surgery.  Chronic low 
back pain, gastroesophageal reflux disease.  All other review of systems 
negative per 12-point review of systems other than those listed in history of 
present illness.
 
ALLERGIES:  No known drug allergies.
 
CURRENT MEDICATIONS:  Omeprazole 40 mg twice a day, aspirin 325 mg per day, 
venlafaxine 75 mg once a day, Norco 5/325 one tab every 6 hours p.r.n. for pain,
MS Contin 15 mg b.i.d., Vyvanse 60 mg once a day, pregabalin 150 mg b.i.d., 
meloxicam 15 mg once a day, tizanidine 4 mg p.o. at bedtime p.r.n., topiramate 
200 mg b.i.d., albuterol 2 puffs q. 4 hours p.r.n., multivitamin 1 tab per day, 
cholecalciferol 25 mcg p.o. q.a.m., magnesium oxide 400 mg once a day, 
glucosamine chondroitin 1 tab per day.
 
IMAGING:  MRI lumbar spine obtained on 08/23/2021 shows a right L5 laminotomy.  
Mild spinal canal stenosis, severe narrowing of the right lateral recess and 
moderate narrowing of the left lateral recess secondary to moderate broad-based 
disk bulge and right paracentral disk protrusion.  Right paracentral disk 
protrusion abuts the bilateral S1 nerve root within the lateral recess.
 
PHYSICAL EXAMINATION:
GENERAL:  Well-developed, well-nourished, well-hydrated, morbidly obese 
51-year-old female, appears her stated age, no acute distress.  Awake, alert and
oriented x3.  Current pain score is rated up to 10/10.
HEENT:  normocephalic, atraumatic.  Pupils equal, round and responsive to light.
 She is wearing a mask in compliance with COVID-19 regulations.
LUNGS:  Appear clear.  No wheeze, rhonchi or rales.
CARDIOVASCULAR:  Regular.  No appreciable gallop or rub.
ABDOMEN:  Soft, obese.
EXTREMITIES:  Show no clubbing, no cyanosis.  No specific edema.  There is 
bandaging over the left knee consistent with a total knee arthroplasty.
MUSCULOSKELETAL:  Lower extremity strength is symmetrical 5/5 intact to light 
touch from L1 through S2 dermatomes.  Deep tendon reflexes are diminished on the
right and tested over the left knee, but ankle is equal and symmetrical 1+/4.  
Straight leg raising is negative in the seated position but pain is generated 
with this maneuver secondary to knee symptoms.  No radicular component.  Supine 
straight leg raising is deferred due to body habitus and pain generated with a 
seated position testing.
 
ASSESSMENT:
1.  Chronic lumbar radiculopathy.
2.  Displacement of lumbar intervertebral disk with radiculopathy.
3.  Lateral recess stenosis of lumbar spine.
4.  Morbid obesity.
 
 
 
 
[facility]
[address]
[place]   [address]                     PAIN MANAGEMENT CONSULTATION  
_______________________________________________________________________________
 
Name:       JOSE ALEJANDRO PAGAN              Room #:         439-P       ADM [place]  
M.R.#:      [id number]                       Account #:      [id number]  
Admission:  08/20/21    Attend Phys:    Carrington Banks MD    
Discharge:              Date of Birth:  [birth year]  
                                                          Report #: [id number]
                                                                    [id number] 
_______________________________________________________________________________
PLAN:
1.  The patient has been referred to our service to discuss a possible epidural 
injection under fluoroscopic guidance.  We have reviewed with the patient her 
MRI findings with the changes at the L5-S1 level.  We discussed the options of 
treatment as an inpatient and the patient did choose to undergo a lumbar 
epidural injection.  We have discussed with the patient the risks and benefits 
of a lumbar epidural injection.  These risks include but are not necessarily 
limited to bleeding, bruising, infection, worsening pain, no relief of pain, 
also risk of temporary or permanent muscle weakness, temporary or permanent 
nerve damage, possible paralysis, post-dural puncture headache and death.  The 
patient states understood and wished to proceed.
2.  No medication changes made at today's visit.  The patient will continue 
current medical therapy as prior prescribed.
3.  We have advised the patient to follow up with her pain management physician 
at [facility] in [facility] upon her discharge from the hospital and 
confirmed that treatment was offered through us today and that she will be 
following up with them for further treatment.  She will follow up with her 
neurosurgery team as well.
4.  We wish to thank the referring physician for the opportunity to see the 
patient in consultation.  We will keep you apprised of her response if she 
returns in regards to the epidural injection provided today.  Again, we wish to 
thank you for the opportunity to see the patient in consultation.  We recommend 
she follow up with her chronic pain physician at [facility] for further treatment.
 
PROCEDURE NOTE
 
DESCRIPTION OF PROCEDURE:  L5-S1 left paramedian epidural steroid injection 
under fluoroscopic guidance.
 
After obtaining written consent, the patient was taken back to fluoroscopy 
suite, placed in prone position with pillow under abdomen to decrease lumbar 
lordosis.  Skin overlying lumbosacral area then prepped and draped in aseptic 
fashion.  The L5-S1 vertebral interspace identified by AP fluoroscopy.  Skin and
subcutaneous tissue overlying target site injection anesthetized with 3 mL of 1%
lidocaine.
 
A 20-gauge 4-1/2 inch Tuohy needle was advanced under fluoroscopic guidance 
towards the epidural space using a left paramedian approach.  Epidural space 
identified using loss of resistance to air technique.  After negative aspiration
for heme or cerebrospinal fluid, 1 mL of Omnipaque injected.  Lumbar epidurogram
confirmed using both AP and lateral fluoroscopy.  After negative aspiration for 
heme or cerebrospinal fluid, 5 mL solution containing 2 mL 40 mg per mL 80 mg 
total triamcinolone along with 3 mL of lidocaine, 1% injected slowly.  Needle 
then retracted snf flushed with 1 mL of 1% lidocaine, then removed.  Sterile
bandage placed over injection site.  No new motor deficits present in the lower 
extremities following procedure.
 
 
 
[address]   [address]                     PAIN MANAGEMENT CONSULTATION  
_______________________________________________________________________________
 
Name:       JOSE ALEJANDRO PAGAN              Room #:         439-P       [facility] [place]  
M.R.#:      [id number]                       Account #:      [id number]  
Admission:  08/20/21    Attend Phys:    Carrington Banks MD    
Discharge:              Date of Birth:  [birth year]  
                                                          Report #: [id number]
                                                                    [id number] 
_______________________________________________________________________________
 
The patient tolerated the procedure well, carefully escorted to recovery room in
stable condition.  No apparent complications.  After meeting discharge criteria,
the patient discharged home.
 
 
 
 
 
 
 
 
 
 
 
 
 
 
 
 
 
 
 
 
 
 
 
 
 
 
 
 
 
 
 
 
 
 
 
 
 
 
 
 
                         
   By:                               
                   
D: 08/25/21 1033                           _____________________________________
T: 08/25/21 2032                           Edin Doe DO            /nt

## 2021-08-21 VITALS — DIASTOLIC BLOOD PRESSURE: 66 MMHG | SYSTOLIC BLOOD PRESSURE: 130 MMHG

## 2021-08-21 VITALS — DIASTOLIC BLOOD PRESSURE: 72 MMHG | SYSTOLIC BLOOD PRESSURE: 130 MMHG

## 2021-08-21 NOTE — NUR
Assumed pt care at 7am.Pt in bed c/o back and knee pain.Assessment completed.
vss.Oral pain med given with partital relief.Dr Banks here,order noted.Received
call from radiology that mri will be done on monday but xray will be done
later today.Physical therapist here for eval and treat.Lunch ordered and given
to pt.Report off to Martín KISER at 1300.Will continue to monitor.

## 2021-08-21 NOTE — NUR
ASSUMED CARE OF PT AT 1300 THIS AFTERNOON. PT IS A/OX4 WITH PAIN IN RT LOWER
BACK AND LEG. ASSESSMENTS AS NOTED IN CHART AND OTHERWISE UNREMARKABLE. IV IN
LT AC SL. PT IS UP AT MOHSEN. CALL LIGHT AND OTHER NEEDS ARE WITHIN REACH. MEDS
AND TX GIVEN AS NEEDED AND SCHEDULED. WILL MONITOR AND NOTE ANY CHANGES.

## 2021-08-21 NOTE — NUR
pain controlled this shift. patient has a surgical knee on left knee. patient
uses bedside commode. scd on. fall precaution in place. patient in bed asleep
at this time breathing regular and unlaboured.

## 2021-08-22 VITALS — DIASTOLIC BLOOD PRESSURE: 77 MMHG | SYSTOLIC BLOOD PRESSURE: 123 MMHG

## 2021-08-22 VITALS — SYSTOLIC BLOOD PRESSURE: 132 MMHG | DIASTOLIC BLOOD PRESSURE: 73 MMHG

## 2021-08-22 VITALS — DIASTOLIC BLOOD PRESSURE: 72 MMHG | SYSTOLIC BLOOD PRESSURE: 126 MMHG

## 2021-08-22 NOTE — NUR
UPON ARRIVAL TO THE SHIFT PATIENT WAS UPSET IN HER ROOM COMPLAINING TO AM
NURSE. PATIENT STATES THAT SHE HAS NOT RECEIVED THE TREATMENT THAT SHE
EXPECTS. STATES THAT HE PAIN LEVEL HAS BEEN UNMANAGED AND THAT SHE WANTS TO BE
TRANSFERRED TO [facility]. PATIENT STATES THAT SHE HAS BEEN UNABLE
TO GET UP TO THE COMMODE DUE TO THE PAIN IN HER L HIP/LEG. AM SHIFT HAS PAGED
FOR PHYSICIAN BUT DID NOT HAVE CALL RETURNED BEFORE LEAVING. NOTIFIED NP WHO
BEGAN CALL ON HS SHIFT. NP CAME TO PATIENT ROOM TO ADDRESS HER COMPLAINTS. NEW
MEDICATIONS WERE ADDED TO THE PROFILE BUT PATIENT INSISTS ON BEING
TRANSFERRED. HOUSE SUPERVISOR NOTIFIED AND CALL TO [place] WAS PLACED.
FACILITY STATES THAT THEY ARE NOT TAKING ANY TRANSFERS AT THIS TIME. PT WAS
UPSET BUT WAS MORE RECEPTIVE AFTER PAIN MEDICATIONS. PATIENT WAS ASSISTED TO
BEDSIDE COMMODE. SHE TOLERATED WELL BUT WAS IN SOME DISTRESS. PATIENT VSS. ICE
PACKS WERE PLACED ON HER KNEE WHEN RETURNING TO BED. PATIENT IS COMPLAINT WITH
OTHER MEDICATIONS. WILL CONTINUE TO MONITOR FOR CHANGE IN STATUS.

## 2021-08-22 NOTE — NUR
PT XFER FROM [facility] WEST AT 1555 THIS AFTERNOON. PT HAS C/O LOWER BACK AND LT LEG
PAIN. PT HAS PAIN MEDS CONTROLLING. PT IS A/OX4, SKIN INTACT. LT KNEE
REPLACEMENT LAST WEEK. BRIAN DRESSING, DARSHAN'S, SCD'S AND POLAR PK IN PLACE.
ASSESSMENTS AS NOTED IN CHART AND OTHERWISE UNREMARKABLE. IV IN LT AC WITH NS
AT 80ML/HR. FALL PRECAUTIONS ARE IN PLACE. CALL LIGHT AND OTHER NEEDS ARE
WITHIN REACH. MEDS AND TX GIVEN AS NEEDED AND SCHEDULED. WILL MONITOR AND NOTE
ANY CHANGES.

## 2021-08-23 VITALS — DIASTOLIC BLOOD PRESSURE: 65 MMHG | SYSTOLIC BLOOD PRESSURE: 115 MMHG

## 2021-08-23 VITALS — SYSTOLIC BLOOD PRESSURE: 135 MMHG | DIASTOLIC BLOOD PRESSURE: 68 MMHG

## 2021-08-23 VITALS — SYSTOLIC BLOOD PRESSURE: 128 MMHG | DIASTOLIC BLOOD PRESSURE: 66 MMHG

## 2021-08-23 NOTE — NUR
ASSUMED CARE OF PT AT 0700 THIS MORNING. PT WAS ANGERED BY ORIGINALLY ASSIGNED
NURSE SO WE EXCHANGED PT'S. PT WAS IN PAIN AND WAS PROJECTING TO THE STAFF. PT
IS A/OX4 AND HAS NO CHANGES SINCE YESTERDAY'S CARE. ASSESSMENTS AS CHARTED AND
OTHERWISE UNREMARKABLE. PT HAS APPT FOR MRI AND AWAITING RESULTS. IV IN LT AC
WITH D5 1/2NS AT 80ML/HR. PT IS ABLE TO PIVOT ONTO COMODE BUT REFUSES DUE TO
PAIN. CALL LIGHT AND OTHER NEEDS ARE WITHIN REACH. MEDS AND TX GIVEN AS NEEDED
AND SCHEDULED. WILL MONITOR AND NOTE ANY CHANGES.

## 2021-08-23 NOTE — NUR
ASSESSMENT: CM REVIEWED CHART AND SPOKE WITH PATIENT AT THE BEDSIDE. PT IS
ALERT AND ORIENTED X4. PT WAS ADMITTED DUE TO BACK PAIN. PT RECENTLY HAD A
LEFT TKA ON 8/17 AND DISCHARGED HOME WITH A WALKER AND HAD BEEN STAYING WITH
HER MOTHER. PT REPORTS HAVING HER OWN APT WITH NO STEPS AND HER MOTHERS HOME
HAS ABOUT 5 STEPS TO ENTER. PT REPORTS THAT SHE WAS INDEPENDENT PRIOR TO TKA
BUT HAS BEEN USING A WALKER SINCE. PT REPORTS INCREASED PAIN. PT HAS MULTIPLE
COMPLAINTS WHILE BEING HERE AT [facility] ABOUT HER PAIN AND REPORTS SHE DOES NOT
FEEL IT IS MANAGED, BEDSIDE RN IS AWARE. PATIENTS INSURANCE IS CURRENTLY
SHOWING PATIENT PAY, CM ASKED PATIENT ABOUT THIS AS LAST ADMISSION SHE WAS
LISTED AS [employer] INSURANCE. PT REPORTS THAT SHE HAS AETNA COBRA BUT WAS TOLD
THAT SHE SHOULD BE ABLE TO STILL USE HER SAME AETNA CARD. CM NOTIFIED UR RN TO
PLEASE VERIFY WITH REGISTRATION AS THIS NEEDS TO BE CLARIFIED ASAP TO ASSIST
WITH DISCHARGE PLANNING. PT COMPLAINING OF PAIN AND REPORTS IF THIS IS NOT
FIXED SHE MAY WANT TO CONSIDER TRANSFERING TO [facility] AS SHE HAD
PAST SURGERY THERE. CM ASKED PT TO TRY AND CLARIFY HER INSURANCE INFORMATION
IF SHE HAS A CARD/ECT. PT IS GETTING AN MRI TODAY. CM WILL CONTINUE TO FOLLOW
TO ASSIST AS NEEDED.

## 2021-08-23 NOTE — NUR
ASSUMED PT CARE AT 1915. PT IS ALERT AND ORIENTED X4. PT IS IRRITABLE. PT IS
NOT PLEASED WITH PAIN MANAGEMNT REGIMENT IN PLACE NOW. PER PT"IT IS NOT
EFFECTIVE AND PREFERS FENATANYL TO OTHER PO PAIN MEDS". PT WAS NON-COMPLIANT
WITH POLAR CARE AND REQUESTED FOR IT TO BED TAKEN OFF. DRSG TO THE L KNEE IS
C/D/I. PT TOLERATING RA. FALL PRECAUTIONS IN PLACE [facility] CALL LIGHT WITHIN
REACH. WILL CONTNUE TO MONITOR.

## 2021-08-24 VITALS — DIASTOLIC BLOOD PRESSURE: 78 MMHG | SYSTOLIC BLOOD PRESSURE: 133 MMHG

## 2021-08-24 VITALS — SYSTOLIC BLOOD PRESSURE: 109 MMHG | DIASTOLIC BLOOD PRESSURE: 63 MMHG

## 2021-08-24 VITALS — SYSTOLIC BLOOD PRESSURE: 138 MMHG | DIASTOLIC BLOOD PRESSURE: 82 MMHG

## 2021-08-24 LAB
ALBUMIN SERPL-MCNC: 2.7 G/DL (ref 3.4–5)
ALT SERPL-CCNC: 55 U/L (ref 14–59)
ANION GAP SERPL CALC-SCNC: 8 MMOL/L (ref 7–16)
AST SERPL-CCNC: 30 U/L (ref 15–37)
BASOPHILS NFR BLD AUTO: 0.3 % (ref 0–2)
BILIRUB SERPL-MCNC: 0.3 MG/DL (ref 0.2–1)
BUN SERPL-MCNC: 26 MG/DL (ref 7–18)
CALCIUM SERPL-MCNC: 8.8 MG/DL (ref 8.5–10.1)
CHLORIDE SERPL-SCNC: 108 MMOL/L (ref 98–107)
CO2 SERPL-SCNC: 26 MMOL/L (ref 21–32)
CREAT SERPL-MCNC: 0.8 MG/DL (ref 0.6–1)
EOSINOPHIL NFR BLD: 0.4 % (ref 0–3)
ERYTHROCYTE [DISTWIDTH] IN BLOOD BY AUTOMATED COUNT: 12.9 % (ref 10.5–14.5)
GLUCOSE SERPL-MCNC: 131 MG/DL (ref 74–106)
GRANULOCYTES NFR BLD MANUAL: 73.2 % (ref 36–66)
HCT VFR BLD CALC: 34.3 % (ref 37–47)
HGB BLD-MCNC: 10.9 GM/DL (ref 12–15)
LYMPHOCYTES NFR BLD AUTO: 21.6 % (ref 24–44)
MAGNESIUM SERPL-MCNC: 2 MG/DL (ref 1.8–2.4)
MCH RBC QN AUTO: 28.8 PG (ref 26–34)
MCHC RBC AUTO-ENTMCNC: 31.8 G/DL (ref 28–37)
MCV RBC: 90.5 FL (ref 80–100)
MONOCYTES NFR BLD: 4.5 % (ref 1–8)
NEUTROPHILS # BLD: 8.1 THOU/UL (ref 1.4–8.2)
PLATELET # BLD: 280 THOU/UL (ref 150–400)
POTASSIUM SERPL-SCNC: 3.6 MMOL/L (ref 3.5–5.1)
PROT SERPL-MCNC: 6.2 G/DL (ref 6.4–8.2)
RBC # BLD AUTO: 3.79 MIL/UL (ref 4.2–5)
SODIUM SERPL-SCNC: 142 MMOL/L (ref 136–145)
WBC # BLD AUTO: 11 THOU/UL (ref 4–11)

## 2021-08-24 NOTE — NUR
DR [facility] WAS CALLED TO GET APPROVAL OF PT HAVING AN EPIDURAL AT PAIN
CLINIC. UNABLE TO SPEAK TO ANYONE, DETAILED MESSAGE WAS LEFT WITH DR [facility]
OFFICE.

## 2021-08-24 NOTE — NUR
on-going assessment: CM REVIEWED CHART AND SPOKE WITH PATIENT. PT PROVIDED CM
WITH PAST LETTER FROM [email or web address] ( [employer] STATING THAT HE TKR WAS
APPROVED, PT STATING THIS SHOULD SHOW I DO HAVE INSURANCE. PT REPORTS SHE
CONTACTED YUMIKO TUBBS [phone] WHO IS THE 
AT HER EMPLOYER ([employer]). PT WAS TOLD BY HER INSURANCE THAT
THEY NEVER RECEIVED CONTINUATION OF BENEFITS LETTER FROM HER EMPLOYER SO SHE
STATES SHE NOTIFIED  YUMIKO AND IS HAVING HER FOLLOW UP. CM ALSO NOTIFIED
UTILIZATION REVIEW RN AND FAXED COPY OF LETTER TO HER.
PAIN MANAGEMENT HAS BEEN CONSULTED ABOUT POSSIBLY GETTING AN EPIDURAL. ORTHO
AS WELL AS NEUROSURGERY ALSO FOLLOWING. THERE WAS ALSO SOME MENTION OF
POSSIBLE TRANSFER IF NOTHING IS DETERMINED PER PATIENT REQUEST TO [facility] AS PT REPORTS HER PAST NEUROSURGEON WAS THERE. CM REACHED OUT TO
YANCI AT [facility] AT [employer] [phone] WHO REPORTS THEY ARE LIKELY NOT
ACCEPTING ANY TRASNFERS AT THIS TIME DUE TO PATIENT VOLUME AND ESPECIALLY IF
INSURANCE IS NOT CURRENTLY VERIFIED. CM WILL CONTINUE TO FOLLOW TO ASSIST AS
NEEDED.

## 2021-08-24 NOTE — NUR
ASSUMED PT CARE AT 1925. PT IS ALERT AND ORIENTED X4. PT HAS BRIAN DRSG TO THE
L KNEE WHICH IS C/D/I WITH NO SIGN OF DRAINAGE. PT CAN BE EASILY IRRITABLE
BECAUSE PT FEELS PAIN IS NOT WELL MANAGED AND NONE OF THE PAIN MEDS ARE
EFFECTIVE. PT USES A BEDPAN. PT ASKED FOR POLAR CARE TO BE PUT BACK ON. PT IS
TOLERATING RA. IV ACCESS WAS REPLACED. NO VISIBLE SIGN OF DISTRESS WAS NOTED.
FALL PRECAUTIONS IN PLACE. WILL CONTINUE TO MONITOR.

## 2021-08-25 VITALS — SYSTOLIC BLOOD PRESSURE: 140 MMHG | DIASTOLIC BLOOD PRESSURE: 69 MMHG

## 2021-08-25 VITALS — DIASTOLIC BLOOD PRESSURE: 90 MMHG | SYSTOLIC BLOOD PRESSURE: 145 MMHG

## 2021-08-25 VITALS — SYSTOLIC BLOOD PRESSURE: 133 MMHG | DIASTOLIC BLOOD PRESSURE: 70 MMHG

## 2021-08-25 VITALS — DIASTOLIC BLOOD PRESSURE: 105 MMHG | SYSTOLIC BLOOD PRESSURE: 137 MMHG

## 2021-08-25 LAB
BACTERIA-REFLEX: (no result) /HPF
BILIRUB UR-MCNC: NEGATIVE MG/DL
CALCIUM OXALATE: (no result) /LPF
COLOR UR: (no result)
KETONES UR STRIP-MCNC: NEGATIVE MG/DL
RBC # UR STRIP: (no result) /UL
RBC #/AREA URNS HPF: (no result) /HPF
SP GR UR STRIP: >= 1.03 (ref 1–1.03)
SQUAMOUS: (no result) /LPF (ref 0–3)
URINE CLARITY: (no result)
URINE GLUCOSE-RANDOM*: NEGATIVE
URINE LEUKOCYTES-REFLEX: (no result)
URINE NITRITE-REFLEX: POSITIVE
URINE PROTEIN (DIPSTICK): (no result)
URINE WBC-REFLEX: (no result) /HPF (ref 0–5)
UROBILINOGEN UR STRIP-ACNC: 1 E.U./DL (ref 0.2–1)

## 2021-08-25 NOTE — NUR
ASSUMED CARE AT 1900 OF 8/24. PATIENT IS A&OX4, PLEASANT AND COOPERATIVE. SHE
REPORTS HER PAIN HAS BEEN BETTER MANAGED. BRIAN DRESSING INPLACE AND AND INTACT
OVER LEFT KNEE. PRN ORAL AND IV PAIN MEDICATION ADMINISTERED TO MANAGE PAIN
IN HIPS, BOTTOM AND LLE. VSS, PATIENT WAS ASSITED TO HAVE A BATH THIS AM. NO
CONCERNS AT THIS TIME.

## 2021-08-25 NOTE — NUR
on-going assessment: CM REVIEWED CHART AND SPOKE WITH PATIENT. PT GOT EPIDURAL
TODAY AND WORKED WITH THERAPY. PT DID WELL WITH THERAPY. CM DISCUSSED CM COULD
POSSIBLY SEND A REFERRAL FOR [facility] UNTIL HER COBRA/INSURANCE IS FIGURED
OUT. PT DECLINED STATING SHE PREFERS TO DO OUTPATIENT AS PREVIOUSLY ARRANGED.
PT REPORTS HER MOTHER DID RECEIVE A BILL FOR HER COBRA IN THE MAIL SO PATIENT
FEELS AS SOON AS IT IS PAID WHICH SHE PLANS ON DOING IT WILL KICK IN.

## 2021-08-25 NOTE — NUR
ASSUMED PT CARE THIS AM. PT IS ALERT & ORIENTED X4. PT HAS IV SITE ON L AC. PT
C/O OF PAIN AND GIVEN PAIN MEDICATION AS PER PT REQUEST. INFORMED HOSPITALIST
REGARDING PA CONCERNED ABOUT BEING ON ASPIRIN. INFORMED DR REGARDING [facility]A
COLLECTION AND HAVING BLOODY URINE. SENT UA. WHEEL PT DOWN TO PAIN CLINIC
TODAY. WILL CONTINUE TO MONITOR PT. FOLLOW POC.

## 2021-08-26 VITALS — DIASTOLIC BLOOD PRESSURE: 77 MMHG | SYSTOLIC BLOOD PRESSURE: 133 MMHG

## 2021-08-26 VITALS — SYSTOLIC BLOOD PRESSURE: 133 MMHG | DIASTOLIC BLOOD PRESSURE: 77 MMHG

## 2021-08-26 NOTE — NUR
on-going assessment: CM REVIEWED CHART AND SPOKE WITH PATIENT AT THE BEDSIDE.
PT HAS ORDERS TO DISCHARGE HOME TODAY. PT REPORTS SHE ENDED UP FINDING OUT
MORE INFORMATION ABOUT HER COBRA. PT REPORTS THROUGH PRESIDENT PROSPER SHE
QUALIFIED FOR A COBRA INSURANCE THAT SHE DID NOT HAVE TO PAY FOR DUE TO AN
INVOLUNTARY REDUCTION IN HOURS CAUSING HER TO HAVE TO BE TERMINATED. SHE
STATES THROUGH THIS ACT SHE QUALIFIED FOR COBRA THAT THE COST WAS COVERED AS
SHE HAD TO SUBMIT A FORM. PT REPORTS SHE WAS TOLD THIS FORM WAS SENT THROUGH
THE WRONG CHANNELS BUT SHE STATES SHE CONTACTED [employer] [phone] ABD WAS
TOLD SHE HAD TO SEND A REQUEST FOR AN URGENT UPDATE TO HER PLAN AND REPORTS
THE COST OF HER COBRA WAS PAID THIS AM. SHE WAS TOLD THAT THE HOSPITAL SHOULD
HOLD OFF ON SUBMITTING ANY BILLS FOR A WEEK SO THIS CAN ALL BE UPDATED IN
THEIR SYSTEM. CM NOTIFIED UR RN. CM ALSO PROVIDED PATIENT WITH THE CONTACT
NUMBER TO THE BUSINESS OFFICE AND REGISTARTION IF SHE NEEDED IT AFTER
DISCHARGE. PT REPORTS HAVING HER EQUIPMENT AT HOME AND STATES SHE IS LIKELY
ODERING A SHOWER CHAIR THROUGH [employer]. PT REPORTS NO FURTHER NEEDS FROM BRIAN AT
THIS TIME.

## 2021-08-26 NOTE — NUR
ASSUMED CARE OF PT AT 1900. PT IS A/O X4 AND IS UP WITH ASSISTANCE USING
WALKER AND GB TO THE BR. DUE TO URGENCY TO VOID PT IS IMPULSIVE AT TIMES
GETTING UP TO THE BSC ON HER OWN. VSS. AFEBRILE. POLAR PACK,SCD'S AND KNEE
HIGH DARSHAN HOSE IN PLACE. DRSG REMAINS C/D/I TO LEFT KNEE. LIDOCAINE PATCH
REMOVED FROM PT LOWER LEFT BUTTOCK AREA. PT HAD SMALL HARD BM THIS NOC AND
STATES SHE HAS BEEN CONSTIPATED FOR NUMEROUS DAYS. SCHEDULED STOOL SOFTNER
PROVIDED AS DIRECTED. MEDICATIONS GIVEN PER MAR. C/O PAIN. PRN PAIN MEDICATION
GIVEN AS DIRECTED. FALL PRECAUTIONS IN PLACE, CALL LIGHT IS WITHIN REACH. WILL
CONTINUE TO MONITOR.

## 2021-08-26 NOTE — NUR
ASSUMED PT CARE THIS AM. PT IS ALERT & ORIENTED X4. PT HAS IV SITE ON R HAND
RUNNING NS @80ML/HR. PT UP WITH ASSIST X1 TO BATHROOM. PT HAS BRIAN DRESSING,
POLAR CARE, SCD. PT HAD LUMBAR EPIDURAL YESTERDAY 8/25. PT C/O OF CHRONIC BACK
PAIN AND GIVEN PAIN MEDICATION AS PER PT REQUEST. WILL CONTINUE TO MONITOR PT.
FOLLOW POC.